# Patient Record
Sex: MALE | Race: WHITE | NOT HISPANIC OR LATINO | Employment: FULL TIME | URBAN - NONMETROPOLITAN AREA
[De-identification: names, ages, dates, MRNs, and addresses within clinical notes are randomized per-mention and may not be internally consistent; named-entity substitution may affect disease eponyms.]

---

## 2018-01-27 ENCOUNTER — ANESTHESIA (OUTPATIENT)
Dept: PERIOP | Facility: HOSPITAL | Age: 53
End: 2018-01-27

## 2018-01-27 ENCOUNTER — APPOINTMENT (OUTPATIENT)
Dept: GENERAL RADIOLOGY | Facility: HOSPITAL | Age: 53
End: 2018-01-27

## 2018-01-27 ENCOUNTER — HOSPITAL ENCOUNTER (EMERGENCY)
Facility: HOSPITAL | Age: 53
Discharge: HOME | End: 2018-01-27
Attending: FAMILY MEDICINE | Admitting: PODIATRIST

## 2018-01-27 ENCOUNTER — ANESTHESIA EVENT (OUTPATIENT)
Dept: PERIOP | Facility: HOSPITAL | Age: 53
End: 2018-01-27

## 2018-01-27 VITALS
SYSTOLIC BLOOD PRESSURE: 159 MMHG | HEIGHT: 72 IN | DIASTOLIC BLOOD PRESSURE: 89 MMHG | OXYGEN SATURATION: 98 % | BODY MASS INDEX: 30.48 KG/M2 | TEMPERATURE: 97.4 F | HEART RATE: 76 BPM | WEIGHT: 225 LBS | RESPIRATION RATE: 20 BRPM

## 2018-01-27 DIAGNOSIS — S92.421B OPEN DISPLACED FRACTURE OF DISTAL PHALANX OF RIGHT GREAT TOE, INITIAL ENCOUNTER: ICD-10-CM

## 2018-01-27 DIAGNOSIS — S92.501A CLOSED FRACTURE OF PHALANX OF RIGHT SECOND TOE, INITIAL ENCOUNTER: ICD-10-CM

## 2018-01-27 DIAGNOSIS — S92.531B OPEN DISPLACED FRACTURE OF DISTAL PHALANX OF LESSER TOE OF RIGHT FOOT, INITIAL ENCOUNTER: ICD-10-CM

## 2018-01-27 DIAGNOSIS — S92.411A CLOSED DISPLACED FRACTURE OF PROXIMAL PHALANX OF RIGHT GREAT TOE, INITIAL ENCOUNTER: ICD-10-CM

## 2018-01-27 DIAGNOSIS — S91.214A: ICD-10-CM

## 2018-01-27 DIAGNOSIS — S91.211A: Primary | ICD-10-CM

## 2018-01-27 PROCEDURE — 13132 CMPLX RPR F/C/C/M/N/AX/G/H/F: CPT | Performed by: PODIATRIST

## 2018-01-27 PROCEDURE — 99284 EMERGENCY DEPT VISIT MOD MDM: CPT

## 2018-01-27 PROCEDURE — 73630 X-RAY EXAM OF FOOT: CPT

## 2018-01-27 PROCEDURE — 25010000003 CEFAZOLIN PER 500 MG: Performed by: FAMILY MEDICINE

## 2018-01-27 PROCEDURE — 28825 PARTIAL AMPUTATION OF TOE: CPT | Performed by: PODIATRIST

## 2018-01-27 PROCEDURE — 25010000002 PROPOFOL 10 MG/ML EMULSION: Performed by: ANESTHESIOLOGY

## 2018-01-27 PROCEDURE — 88305 TISSUE EXAM BY PATHOLOGIST: CPT | Performed by: PODIATRIST

## 2018-01-27 PROCEDURE — 88305 TISSUE EXAM BY PATHOLOGIST: CPT | Performed by: PATHOLOGY

## 2018-01-27 PROCEDURE — 99283 EMERGENCY DEPT VISIT LOW MDM: CPT | Performed by: PODIATRIST

## 2018-01-27 PROCEDURE — 96374 THER/PROPH/DIAG INJ IV PUSH: CPT

## 2018-01-27 RX ORDER — OXYCODONE AND ACETAMINOPHEN 7.5; 325 MG/1; MG/1
1 TABLET ORAL ONCE
Status: COMPLETED | OUTPATIENT
Start: 2018-01-27 | End: 2018-01-27

## 2018-01-27 RX ORDER — CLINDAMYCIN HYDROCHLORIDE 300 MG/1
300 CAPSULE ORAL 3 TIMES DAILY
Qty: 9 CAPSULE | Refills: 0 | Status: SHIPPED | OUTPATIENT
Start: 2018-01-27

## 2018-01-27 RX ORDER — ACETAMINOPHEN 325 MG/1
650 TABLET ORAL ONCE AS NEEDED
Status: DISCONTINUED | OUTPATIENT
Start: 2018-01-27 | End: 2018-01-27 | Stop reason: HOSPADM

## 2018-01-27 RX ORDER — BUPIVACAINE HYDROCHLORIDE 5 MG/ML
INJECTION, SOLUTION EPIDURAL; INTRACAUDAL AS NEEDED
Status: DISCONTINUED | OUTPATIENT
Start: 2018-01-27 | End: 2018-01-27 | Stop reason: HOSPADM

## 2018-01-27 RX ORDER — ONDANSETRON 8 MG/1
8 TABLET, ORALLY DISINTEGRATING ORAL EVERY 8 HOURS PRN
Qty: 30 TABLET | Refills: 0 | Status: SHIPPED | OUTPATIENT
Start: 2018-01-27

## 2018-01-27 RX ORDER — DIPHENHYDRAMINE HYDROCHLORIDE 50 MG/ML
12.5 INJECTION INTRAMUSCULAR; INTRAVENOUS
Status: DISCONTINUED | OUTPATIENT
Start: 2018-01-27 | End: 2018-01-27 | Stop reason: HOSPADM

## 2018-01-27 RX ORDER — SODIUM CHLORIDE 9 MG/ML
INJECTION, SOLUTION INTRAVENOUS
Status: DISCONTINUED
Start: 2018-01-27 | End: 2018-01-27 | Stop reason: HOSPADM

## 2018-01-27 RX ORDER — ATENOLOL 25 MG/1
25 TABLET ORAL DAILY
COMMUNITY

## 2018-01-27 RX ORDER — HYDROCODONE BITARTRATE AND ACETAMINOPHEN 10; 325 MG/1; MG/1
1 TABLET ORAL EVERY 4 HOURS PRN
Qty: 30 TABLET | Refills: 0 | Status: SHIPPED | OUTPATIENT
Start: 2018-01-27 | End: 2018-02-05 | Stop reason: SDUPTHER

## 2018-01-27 RX ORDER — EPHEDRINE SULFATE 50 MG/ML
5 INJECTION, SOLUTION INTRAVENOUS ONCE AS NEEDED
Status: DISCONTINUED | OUTPATIENT
Start: 2018-01-27 | End: 2018-01-27 | Stop reason: HOSPADM

## 2018-01-27 RX ORDER — ONDANSETRON 2 MG/ML
4 INJECTION INTRAMUSCULAR; INTRAVENOUS ONCE AS NEEDED
Status: DISCONTINUED | OUTPATIENT
Start: 2018-01-27 | End: 2018-01-27 | Stop reason: HOSPADM

## 2018-01-27 RX ORDER — LABETALOL HYDROCHLORIDE 5 MG/ML
5 INJECTION, SOLUTION INTRAVENOUS
Status: DISCONTINUED | OUTPATIENT
Start: 2018-01-27 | End: 2018-01-27 | Stop reason: HOSPADM

## 2018-01-27 RX ORDER — NALOXONE HCL 0.4 MG/ML
0.2 VIAL (ML) INJECTION AS NEEDED
Status: DISCONTINUED | OUTPATIENT
Start: 2018-01-27 | End: 2018-01-27 | Stop reason: HOSPADM

## 2018-01-27 RX ORDER — BUPIVACAINE HYDROCHLORIDE 5 MG/ML
INJECTION, SOLUTION EPIDURAL; INTRACAUDAL
Status: DISCONTINUED
Start: 2018-01-27 | End: 2018-01-27 | Stop reason: HOSPADM

## 2018-01-27 RX ORDER — ACETAMINOPHEN 650 MG/1
650 SUPPOSITORY RECTAL ONCE AS NEEDED
Status: DISCONTINUED | OUTPATIENT
Start: 2018-01-27 | End: 2018-01-27 | Stop reason: HOSPADM

## 2018-01-27 RX ORDER — PROPOFOL 10 MG/ML
VIAL (ML) INTRAVENOUS AS NEEDED
Status: DISCONTINUED | OUTPATIENT
Start: 2018-01-27 | End: 2018-01-27 | Stop reason: SURG

## 2018-01-27 RX ORDER — FLUMAZENIL 0.1 MG/ML
0.2 INJECTION INTRAVENOUS AS NEEDED
Status: DISCONTINUED | OUTPATIENT
Start: 2018-01-27 | End: 2018-01-27 | Stop reason: HOSPADM

## 2018-01-27 RX ORDER — MEPERIDINE HYDROCHLORIDE 50 MG/ML
12.5 INJECTION INTRAMUSCULAR; INTRAVENOUS; SUBCUTANEOUS
Status: DISCONTINUED | OUTPATIENT
Start: 2018-01-27 | End: 2018-01-27 | Stop reason: HOSPADM

## 2018-01-27 RX ADMIN — OXYCODONE HYDROCHLORIDE AND ACETAMINOPHEN 1 TABLET: 7.5; 325 TABLET ORAL at 14:16

## 2018-01-27 RX ADMIN — SODIUM CHLORIDE 1000 ML: 900 INJECTION, SOLUTION INTRAVENOUS at 17:22

## 2018-01-27 RX ADMIN — PROPOFOL 200 MG: 10 INJECTION, EMULSION INTRAVENOUS at 18:19

## 2018-01-27 RX ADMIN — SODIUM CHLORIDE 2 G: 9 INJECTION INTRAMUSCULAR; INTRAVENOUS; SUBCUTANEOUS at 17:22

## 2018-01-27 NOTE — ANESTHESIA PREPROCEDURE EVALUATION
Anesthesia Evaluation     NPO Solid Status: > 8 hours  NPO Liquid Status: > 8 hours     Airway   Mallampati: II  TM distance: >3 FB  Neck ROM: full  no difficulty expected  Dental    (+) poor dentition    Pulmonary     breath sounds clear to auscultation  (+) a smoker Current, COPD mild,   Cardiovascular     Rhythm: regular  Rate: normal    (+) hypertension,       Neuro/Psych  (+) psychiatric history Anxiety,     GI/Hepatic/Renal/Endo    (+) obesity,  GERD well controlled,     Musculoskeletal     Abdominal     Abdomen: soft.   Substance History      OB/GYN          Other   (+) arthritis                                             Anesthesia Plan    ASA 3     general     intravenous induction

## 2018-01-28 NOTE — ANESTHESIA POSTPROCEDURE EVALUATION
Patient: Say Ha    Procedure Summary     Date Anesthesia Start Anesthesia Stop Room / Location    01/27/18 1820   [facility] / [facility]       Procedure Diagnosis Surgeon Provider    FOOT IRRIGATION, DEBRIDEMENT AND REPAIR (Right Foot) Open displaced fracture of distal phalanx of lesser toe of right foot, initial encounter; Open displaced fracture of distal phalanx of right great toe, initial encounter; Laceration of lesser toe of right foot with damage to nail, foreign body presence unspecified, initial encounter; Laceration of right great toe with damage to nail, foreign body presence unspecified, initial encounter  (Open displaced fracture of distal phalanx of lesser toe of right foot, initial encounter [S92.531B]; Open displaced fracture of distal phalanx of right great toe, initial encounter [S92.421B]; Laceration of lesser toe of right foot with damage to nail, foreign body presence unspecified, initial encounter [S91.214A]; Laceration of right great toe with damage to nail, foreign body presence unspecified, initial encounter [S91.211A]) OLGA Baumann MD          Anesthesia Type: general  Last vitals  BP   142/95 (01/27/18 1712)   Temp   98.6 °F (37 °C) (01/27/18 1353)   Pulse   65 (01/27/18 1712)   Resp   18 (01/27/18 1712)     SpO2   95 % (01/27/18 1712)     Post Anesthesia Care and Evaluation    Patient location during evaluation: PACU  Patient participation: complete - patient participated  Level of consciousness: awake  Pain score: 1  Pain management: adequate  Airway patency: patent  Anesthetic complications: No anesthetic complications  PONV Status: none  Cardiovascular status: acceptable  Respiratory status: acceptable  Hydration status: acceptable

## 2018-01-30 ENCOUNTER — OFFICE VISIT (OUTPATIENT)
Dept: PODIATRY | Facility: CLINIC | Age: 53
End: 2018-01-30

## 2018-01-30 VITALS
BODY MASS INDEX: 30.48 KG/M2 | HEART RATE: 67 BPM | DIASTOLIC BLOOD PRESSURE: 113 MMHG | OXYGEN SATURATION: 95 % | SYSTOLIC BLOOD PRESSURE: 159 MMHG | HEIGHT: 72 IN | WEIGHT: 225 LBS

## 2018-01-30 DIAGNOSIS — S92.421D OPEN DISPLACED FRACTURE OF DISTAL PHALANX OF RIGHT GREAT TOE WITH ROUTINE HEALING, SUBSEQUENT ENCOUNTER: ICD-10-CM

## 2018-01-30 DIAGNOSIS — S92.531D: Primary | ICD-10-CM

## 2018-01-30 DIAGNOSIS — S91.211D: ICD-10-CM

## 2018-01-30 LAB
LAB AP CASE REPORT: NORMAL
Lab: NORMAL
PATH REPORT.FINAL DX SPEC: NORMAL
PATH REPORT.GROSS SPEC: NORMAL

## 2018-01-30 PROCEDURE — 99024 POSTOP FOLLOW-UP VISIT: CPT | Performed by: PODIATRIST

## 2018-01-30 NOTE — PROGRESS NOTES
Say Ha  1965  52 y.o. male     Patient presents today for post op recheck of his right foot.    01/30/2018    Chief Complaint   Patient presents with   • Right Foot - postop recheck           History of Present Illness    Say Ha is a 52 y.o.male who presents to clinic for his first postoperative visit.  Had right irrigation debridement with right partial second digit amputation and repair of lacerations to the great toe and second toe date of surgery January 27, 2018.  His dressings are clean, dry and intact.  He is weightbearing in surgical shoe.          Past Medical History:   Diagnosis Date   • Crush injury of foot, right, subsequent encounter 01/28/2018   • Hypertension          Past Surgical History:   Procedure Laterality Date   • FOOT IRRIGATION, DEBRIDEMENT AND REPAIR Right 1/27/2018    Procedure: FOOT IRRIGATION, DEBRIDEMENT AND REPAIR;  Surgeon: Ángel Griffin DPM;  Location: [facility];  Service:    • FOOT SURGERY     • SINUS SURGERY           Family History   Problem Relation Age of Onset   • Heart disease Father        No Known Allergies    Social History     Social History   • Marital status:      Spouse name: N/A   • Number of children: N/A   • Years of education: N/A     Occupational History   • Not on file.     Social History Main Topics   • Smoking status: Never Smoker   • Smokeless tobacco: Never Used   • Alcohol use Yes      Comment: regular   • Drug use: No   • Sexual activity: Yes     Partners: Female     Other Topics Concern   • Not on file     Social History Narrative         Current Outpatient Prescriptions   Medication Sig Dispense Refill   • atenolol (TENORMIN) 25 MG tablet Take 25 mg by mouth Daily.     • clindamycin (CLEOCIN) 300 MG capsule Take 1 capsule by mouth 3 (Three) Times a Day. 9 capsule 0   • HYDROcodone-acetaminophen (NORCO)  MG per tablet Take 1 tablet by mouth Every 4 (Four) Hours As Needed for Moderate Pain . 30 tablet 0   •  "ondansetron ODT (ZOFRAN ODT) 8 MG disintegrating tablet Take 1 tablet by mouth Every 8 (Eight) Hours As Needed for Nausea or Vomiting. 30 tablet 0     No current facility-administered medications for this visit.          OBJECTIVE    BP (!) 159/113  Pulse 67  Ht 182.9 cm (72\")  Wt 102 kg (225 lb)  SpO2 95%  BMI 30.52 kg/m2      Review of Systems   Constitutional: Negative.  Negative for activity change.   HENT: Negative.  Negative for congestion.    Eyes: Negative.  Negative for discharge.   Respiratory: Negative.  Negative for apnea.    Cardiovascular: Negative.  Negative for chest pain.   Gastrointestinal: Negative.  Negative for abdominal distention.   Endocrine: Negative.  Negative for cold intolerance.   Genitourinary: Negative.  Negative for difficulty urinating.   Musculoskeletal:        Right foot pain   Skin: Negative.  Negative for color change.   Allergic/Immunologic: Negative.  Negative for environmental allergies.   Neurological: Negative.  Negative for dizziness.   Hematological: Negative.  Negative for adenopathy.   Psychiatric/Behavioral: Negative.  Negative for agitation.         Constitutional: well developed, well nourished    HEENT: Normocephalic and atraumatic, normal hearing    Respiratory: Non labored respirations noted    RLE Exam: Laceration sites are well approximated.  Sutures are intact to partial second digit amputation.  Moderate erythema, edema and ecchymosis.  Distal tip of right second digit appears slightly necrotic.  Negative Homans    Psychiatric: A&O x 3 with normal mood and affect. NAD.       Procedures        ASSESSMENT AND PLAN    Say was seen today for postop recheck.    Diagnoses and all orders for this visit:    Open displaced fracture of distal phalanx of lesser toe of right foot with routine healing, subsequent encounter    Open displaced fracture of distal phalanx of right great toe with routine healing, subsequent encounter    Laceration of right great toe with " damage to nail, foreign body presence unspecified, subsequent encounter    - Sterile dressing change performed  - Keep dressing clean, dry and intact  - Weightbearing as tolerated in surgical shoe  - Continue antibiotics until course is complete  - all questions were answered  - return to clinic in 1 week, repeat x-rays          This document has been electronically signed by Ángel Griffin DPM on January 30, 2018 12:50 PM     1/30/2018  12:50 PM

## 2018-01-31 ENCOUNTER — TELEPHONE (OUTPATIENT)
Dept: PODIATRY | Facility: CLINIC | Age: 53
End: 2018-01-31

## 2018-01-31 NOTE — TELEPHONE ENCOUNTER
WIFE CALLED AND SAID THAT PATIENT TOOK LAST ANTIBIOTIC THIS MORNING.    IS THAT OKAY?  SHOULD HE STILL BE ON THEM?    THANKS.

## 2018-02-01 NOTE — OP NOTE
Date of Procedure: 1/27/18     SURGEON: Ángel Griffin DPM      ASSISTANT: PETEY Ponce      PREOPERATIVE DIAGNOSIS:   1.  Open fracture right hallux distal phalanx  2.  Laceration with damage to nail right hallux  3.  Open fracture of second digit  right foot  4.  Laceration with destruction of nail and nailbed right second digit  5.  Crush injury right foot     POSTOPERATIVE DIAGNOSIS: same     PROCEDURES PERFORMED:   1.  Repair of complex laceration measuring approximately 3 cm right hallux  2.  Right partial second digit amputation     ANESTHESIA: mac with local block     HEMOSTASIS: direct pressure      ESTIMATED BLOOD LOSS: 50 mL.      MATERIALS: none     INJECTABLES:20cc 0.5% marcaine plain     COMPLICATIONS: None.      CONDITION: Stable.     PATHOLOGY: none     INDICATIONS FOR PROCEDURE: This is a 52-year-old male who was seen in the emergency department this afternoon with acute crush injury to his right foot.  An attempt was made to repair lacerations to the toes in the emergency department.  However, it was determined that distal part of the right second digit was unsalvageable.  The plan is for repair of laceration to right great toe and partial right second digit amputation.  He agrees to undergo the surgical intervention at this time. Consent is signed and documented in the chart. History and physical exam were reviewed prior to patient being taken to the operating room and n.p.o. status was confirmed. No guarantees were given or implied regarding the outcome of this treatment.      DESCRIPTION OF PROCEDURE: After mild sedation was administered by the anesthesia team in the preoperative holding area the patient was transported to the operating room and placed in a supine position. Iv sedation was then administered and the right foot was prepped and draped in usual sterile technique and a timeout was performed to confirm the correct patient, correct site, and correct procedure.      Attention  was then directed to the right foot with there is noted to be a 3 cm laceration to the lateral aspect of the right hallux with damage to the nailbed and lateral nail plate.  The laceration extended deep to bone.  The second digit was 80% transected at the distal most aspect.  There is exposed bone within the wound.  The distal aspect of the toe was starting to appear dusky and has delayed capillary fill time.  At this time both toes were irrigated with copious amounts of antibiotic irrigation.  Next the right hallux laceration was reapproximated utilizing 4-0 Vicryl for deep and 4-0 nylon for the skin.  The second digit was deemed nonsalvageable to the distal aspect.  At this time the distal toe was disarticulated at the distal interphalangeal joint.  Toe was closed with 4-0 nylon.  Sterile dressing was then applied.  The patient tolerated anesthesia and the procedure well and was transported from the operating room to the PACU with vital signs stable and neurovascular status intact to the operative extremity.    The plan is to discharge this patient home once stable per anesthesia.  He can resume his regular diet.  He is weightbearing as tolerated in a surgical shoe.  He is to keep the dressing clean, dry and intact until his first postoperative visit.  He has been given a written prescription for oral narcotic as well as an oral antibiotic.          This document has been electronically signed by Ángel Griffin DPM on February 1, 2018 12:35 PM

## 2018-02-05 ENCOUNTER — OFFICE VISIT (OUTPATIENT)
Dept: PODIATRY | Facility: CLINIC | Age: 53
End: 2018-02-05

## 2018-02-05 VITALS — HEART RATE: 73 BPM | OXYGEN SATURATION: 98 % | HEIGHT: 72 IN | WEIGHT: 225 LBS | BODY MASS INDEX: 30.48 KG/M2

## 2018-02-05 DIAGNOSIS — S92.421D OPEN DISPLACED FRACTURE OF DISTAL PHALANX OF RIGHT GREAT TOE WITH ROUTINE HEALING, SUBSEQUENT ENCOUNTER: ICD-10-CM

## 2018-02-05 DIAGNOSIS — S92.531D: Primary | ICD-10-CM

## 2018-02-05 DIAGNOSIS — S91.211D: ICD-10-CM

## 2018-02-05 PROCEDURE — 99024 POSTOP FOLLOW-UP VISIT: CPT | Performed by: PODIATRIST

## 2018-02-05 RX ORDER — HYDROCODONE BITARTRATE AND ACETAMINOPHEN 10; 325 MG/1; MG/1
1 TABLET ORAL EVERY 6 HOURS PRN
Qty: 30 TABLET | Refills: 0 | Status: SHIPPED | OUTPATIENT
Start: 2018-02-05 | End: 2018-02-26 | Stop reason: SDUPTHER

## 2018-02-05 NOTE — PROGRESS NOTES
Say Ha  1965  52 y.o. male     Patient presents today for post op recheck of his right foot with repeat xrays.    01/30/2018    Chief Complaint   Patient presents with   • Right Foot - post op recheck           History of Present Illness    Say Ha is a 52 y.o.male who presents to clinic for his second postoperative visit.  Had right irrigation debridement with right partial second digit amputation and repair of lacerations to the great toe and second toe date of surgery January 27, 2018.  His dressings are clean, dry and intact.  He is weightbearing in surgical shoe.          Past Medical History:   Diagnosis Date   • Crush injury of foot, right, subsequent encounter 01/28/2018   • Hypertension          Past Surgical History:   Procedure Laterality Date   • FOOT IRRIGATION, DEBRIDEMENT AND REPAIR Right 1/27/2018    Procedure: FOOT IRRIGATION, DEBRIDEMENT AND REPAIR;  Surgeon: Ángel Griffin DPM;  Location: [facility];  Service:    • FOOT SURGERY     • SINUS SURGERY           Family History   Problem Relation Age of Onset   • Heart disease Father        No Known Allergies    Social History     Social History   • Marital status:      Spouse name: N/A   • Number of children: N/A   • Years of education: N/A     Occupational History   • Not on file.     Social History Main Topics   • Smoking status: Never Smoker   • Smokeless tobacco: Never Used   • Alcohol use Yes      Comment: regular   • Drug use: No   • Sexual activity: Yes     Partners: Female     Other Topics Concern   • Not on file     Social History Narrative         Current Outpatient Prescriptions   Medication Sig Dispense Refill   • atenolol (TENORMIN) 25 MG tablet Take 25 mg by mouth Daily.     • clindamycin (CLEOCIN) 300 MG capsule Take 1 capsule by mouth 3 (Three) Times a Day. 9 capsule 0   • HYDROcodone-acetaminophen (NORCO)  MG per tablet Take 1 tablet by mouth Every 6 (Six) Hours As Needed for Moderate Pain . 30  "tablet 0   • ondansetron ODT (ZOFRAN ODT) 8 MG disintegrating tablet Take 1 tablet by mouth Every 8 (Eight) Hours As Needed for Nausea or Vomiting. 30 tablet 0     No current facility-administered medications for this visit.          OBJECTIVE    Pulse 73  Ht 182.9 cm (72\")  Wt 102 kg (225 lb)  SpO2 98%  BMI 30.52 kg/m2      Review of Systems   Constitutional: Negative.  Negative for activity change.   HENT: Negative.  Negative for congestion.    Eyes: Negative.  Negative for discharge.   Respiratory: Negative.  Negative for apnea.    Cardiovascular: Negative.  Negative for chest pain.   Gastrointestinal: Negative.  Negative for abdominal distention.   Endocrine: Negative.  Negative for cold intolerance.   Genitourinary: Negative.  Negative for difficulty urinating.   Musculoskeletal:        Right foot pain   Skin: Negative.  Negative for color change.   Allergic/Immunologic: Negative.  Negative for environmental allergies.   Neurological: Negative.  Negative for dizziness.   Hematological: Negative.  Negative for adenopathy.   Psychiatric/Behavioral: Negative.  Negative for agitation.         Constitutional: well developed, well nourished    HEENT: Normocephalic and atraumatic, normal hearing    Respiratory: Non labored respirations noted    RLE Exam: Laceration sites are well approximated.  Sutures are intact to partial second digit amputation.  Improved erythema, edema and ecchymosis.  Distal tip of right second digit has improved. Small area of necrosis still noted. .  Negative Homans    Psychiatric: A&O x 3 with normal mood and affect. NAD.       Procedures        ASSESSMENT AND PLAN    Say was seen today for post op recheck.    Diagnoses and all orders for this visit:    Open displaced fracture of distal phalanx of lesser toe of right foot with routine healing, subsequent encounter  -     XR Foot 3+ View Right    Open displaced fracture of distal phalanx of right great toe with routine healing, " subsequent encounter  -     XR Foot 3+ View Right    Laceration of right great toe with damage to nail, foreign body presence unspecified, subsequent encounter    Other orders  -     HYDROcodone-acetaminophen (NORCO)  MG per tablet; Take 1 tablet by mouth Every 6 (Six) Hours As Needed for Moderate Pain .    - x-rays taken and reviewed  - Sterile dressing change performed  - Keep dressing clean, dry and intact  - Weightbearing as tolerated in surgical shoe  - all questions were answered  - return to clinic in 1 week           This document has been electronically signed by Ángel Griffin DPM on February 5, 2018 12:49 PM     2/5/2018  12:49 PM

## 2018-02-13 ENCOUNTER — OFFICE VISIT (OUTPATIENT)
Dept: PODIATRY | Facility: CLINIC | Age: 53
End: 2018-02-13

## 2018-02-13 VITALS — HEIGHT: 72 IN | OXYGEN SATURATION: 98 % | BODY MASS INDEX: 30.48 KG/M2 | HEART RATE: 79 BPM | WEIGHT: 225 LBS

## 2018-02-13 DIAGNOSIS — S92.421D OPEN DISPLACED FRACTURE OF DISTAL PHALANX OF RIGHT GREAT TOE WITH ROUTINE HEALING, SUBSEQUENT ENCOUNTER: ICD-10-CM

## 2018-02-13 DIAGNOSIS — S91.211D: ICD-10-CM

## 2018-02-13 DIAGNOSIS — S92.531D: Primary | ICD-10-CM

## 2018-02-13 PROCEDURE — 99024 POSTOP FOLLOW-UP VISIT: CPT | Performed by: PODIATRIST

## 2018-02-13 NOTE — PROGRESS NOTES
Say Ha  1965  52 y.o. male     Patient presents today for post op recheck of his right foot.    02/13/2018      Chief Complaint   Patient presents with   • Right Foot - post op recheck           History of Present Illness    Say Ha is a 52 y.o.male who presents to clinic for his third postoperative visit.  Had right irrigation debridement with right partial second digit amputation and repair of lacerations to the great toe and second toe date of surgery January 27, 2018.  His dressings are clean, dry and intact.  He is weightbearing in surgical shoe.          Past Medical History:   Diagnosis Date   • Crush injury of foot, right, subsequent encounter 01/28/2018   • Hypertension          Past Surgical History:   Procedure Laterality Date   • FOOT IRRIGATION, DEBRIDEMENT AND REPAIR Right 1/27/2018    Procedure: FOOT IRRIGATION, DEBRIDEMENT AND REPAIR;  Surgeon: Ángel Griffin DPM;  Location: [facility];  Service:    • FOOT SURGERY     • SINUS SURGERY           Family History   Problem Relation Age of Onset   • Heart disease Father        No Known Allergies    Social History     Social History   • Marital status:      Spouse name: N/A   • Number of children: N/A   • Years of education: N/A     Occupational History   • Not on file.     Social History Main Topics   • Smoking status: Never Smoker   • Smokeless tobacco: Never Used   • Alcohol use Yes      Comment: regular   • Drug use: No   • Sexual activity: Yes     Partners: Female     Other Topics Concern   • Not on file     Social History Narrative         Current Outpatient Prescriptions   Medication Sig Dispense Refill   • atenolol (TENORMIN) 25 MG tablet Take 25 mg by mouth Daily.     • clindamycin (CLEOCIN) 300 MG capsule Take 1 capsule by mouth 3 (Three) Times a Day. 9 capsule 0   • HYDROcodone-acetaminophen (NORCO)  MG per tablet Take 1 tablet by mouth Every 6 (Six) Hours As Needed for Moderate Pain . 30 tablet 0   •  "ondansetron ODT (ZOFRAN ODT) 8 MG disintegrating tablet Take 1 tablet by mouth Every 8 (Eight) Hours As Needed for Nausea or Vomiting. 30 tablet 0     No current facility-administered medications for this visit.          OBJECTIVE    Pulse 79  Ht 182.9 cm (72\")  Wt 102 kg (225 lb)  SpO2 98%  BMI 30.52 kg/m2      Review of Systems   Constitutional: Negative.  Negative for activity change.   HENT: Negative.  Negative for congestion.    Eyes: Negative.  Negative for discharge.   Respiratory: Negative.  Negative for apnea.    Cardiovascular: Negative.  Negative for chest pain.   Gastrointestinal: Negative.  Negative for abdominal distention.   Endocrine: Negative.  Negative for cold intolerance.   Genitourinary: Negative.  Negative for difficulty urinating.   Musculoskeletal: Negative.  Negative for arthralgias.   Skin: Negative.  Negative for color change.   Allergic/Immunologic: Negative.  Negative for environmental allergies.   Neurological: Negative.  Negative for dizziness.   Hematological: Negative.  Negative for adenopathy.   Psychiatric/Behavioral: Negative.  Negative for agitation.         Constitutional: well developed, well nourished    HEENT: Normocephalic and atraumatic, normal hearing    Respiratory: Non labored respirations noted    RLE Exam: Laceration sites are well approximated.  Sutures are intact to partial second digit amputation.  Improved erythema, edema and ecchymosis.  Distal tip of right second digit has improved. Small area of necrosis still noted. .  Negative Homans    Psychiatric: A&O x 3 with normal mood and affect. NAD.       Procedures        ASSESSMENT AND PLAN    Say was seen today for post op recheck.    Diagnoses and all orders for this visit:    Open displaced fracture of distal phalanx of lesser toe of right foot with routine healing, subsequent encounter    Open displaced fracture of distal phalanx of right great toe with routine healing, subsequent encounter    Laceration " of right great toe with damage to nail, foreign body presence unspecified, subsequent encounter    - sutures removed.  - Sterile dressing change performed  - Keep dressing clean, dry and intact  - Weightbearing as tolerated in surgical shoe  - all questions were answered  - return to clinic in 1 week           This document has been electronically signed by Ángel Griffin DPM on February 13, 2018 11:39 AM     2/13/2018  11:39 AM

## 2018-02-19 ENCOUNTER — OFFICE VISIT (OUTPATIENT)
Dept: PODIATRY | Facility: CLINIC | Age: 53
End: 2018-02-19

## 2018-02-19 VITALS — HEIGHT: 72 IN | HEART RATE: 80 BPM | BODY MASS INDEX: 30.48 KG/M2 | OXYGEN SATURATION: 98 % | WEIGHT: 225 LBS

## 2018-02-19 DIAGNOSIS — S92.421D OPEN DISPLACED FRACTURE OF DISTAL PHALANX OF RIGHT GREAT TOE WITH ROUTINE HEALING, SUBSEQUENT ENCOUNTER: ICD-10-CM

## 2018-02-19 DIAGNOSIS — S92.531D: Primary | ICD-10-CM

## 2018-02-19 DIAGNOSIS — S91.211D: ICD-10-CM

## 2018-02-19 PROCEDURE — 99024 POSTOP FOLLOW-UP VISIT: CPT | Performed by: PODIATRIST

## 2018-02-19 NOTE — PROGRESS NOTES
Say Ha  1965  52 y.o. male     Patient presents today for post op recheck of his right foot.    02/13/2018  Chief Complaint   Patient presents with   • Right Foot - post op recheck           History of Present Illness    Say Ha is a 52 y.o.male who presents to clinic for his postoperative visit.  Had right irrigation debridement with right partial second digit amputation and repair of lacerations to the great toe and second toe date of surgery January 27, 2018.He is weightbearing in surgical shoe.          Past Medical History:   Diagnosis Date   • Crush injury of foot, right, subsequent encounter 01/28/2018   • Hypertension          Past Surgical History:   Procedure Laterality Date   • FOOT IRRIGATION, DEBRIDEMENT AND REPAIR Right 1/27/2018    Procedure: FOOT IRRIGATION, DEBRIDEMENT AND REPAIR;  Surgeon: Ángel Griffin DPM;  Location: [facility];  Service:    • FOOT SURGERY     • SINUS SURGERY           Family History   Problem Relation Age of Onset   • Heart disease Father        No Known Allergies    Social History     Social History   • Marital status:      Spouse name: N/A   • Number of children: N/A   • Years of education: N/A     Occupational History   • Not on file.     Social History Main Topics   • Smoking status: Never Smoker   • Smokeless tobacco: Never Used   • Alcohol use Yes      Comment: regular   • Drug use: No   • Sexual activity: Yes     Partners: Female     Other Topics Concern   • Not on file     Social History Narrative         Current Outpatient Prescriptions   Medication Sig Dispense Refill   • atenolol (TENORMIN) 25 MG tablet Take 25 mg by mouth Daily.     • clindamycin (CLEOCIN) 300 MG capsule Take 1 capsule by mouth 3 (Three) Times a Day. 9 capsule 0   • HYDROcodone-acetaminophen (NORCO)  MG per tablet Take 1 tablet by mouth Every 6 (Six) Hours As Needed for Moderate Pain . 30 tablet 0   • ondansetron ODT (ZOFRAN ODT) 8 MG disintegrating tablet  "Take 1 tablet by mouth Every 8 (Eight) Hours As Needed for Nausea or Vomiting. 30 tablet 0     No current facility-administered medications for this visit.          OBJECTIVE    Pulse 80  Ht 182.9 cm (72\")  Wt 102 kg (225 lb)  SpO2 98%  BMI 30.52 kg/m2      Review of Systems   Constitutional: Negative.  Negative for activity change and appetite change.   HENT: Negative.  Negative for congestion and dental problem.    Eyes: Negative.  Negative for discharge.   Respiratory: Negative.  Negative for apnea.    Cardiovascular: Negative.  Negative for chest pain and leg swelling.   Gastrointestinal: Negative.  Negative for abdominal distention.   Endocrine: Negative.  Negative for cold intolerance.   Genitourinary: Negative.  Negative for difficulty urinating and dysuria.   Musculoskeletal: Negative.  Negative for arthralgias.   Skin: Negative.  Negative for color change.   Allergic/Immunologic: Negative.  Negative for environmental allergies.   Neurological: Negative.  Negative for dizziness.   Hematological: Negative.  Negative for adenopathy.   Psychiatric/Behavioral: Negative.  Negative for agitation.         Constitutional: well developed, well nourished    HEENT: Normocephalic and atraumatic, normal hearing    Respiratory: Non labored respirations noted    RLE Exam: Distal tip of right second digit continues to improve.. Small area of delayed healing noted. Hallux is healing nicely .  Negative Homans    Psychiatric: A&O x 3 with normal mood and affect. NAD.       Procedures        ASSESSMENT AND PLAN    Say was seen today for post op recheck.    Diagnoses and all orders for this visit:    Open displaced fracture of distal phalanx of lesser toe of right foot with routine healing, subsequent encounter    Open displaced fracture of distal phalanx of right great toe with routine healing, subsequent encounter    Laceration of right great toe with damage to nail, foreign body presence unspecified, subsequent " encounter    - applied medihoney and bandaids  - pt to do daily dressing changes. Do not get wet  - ok to wear regular shoe gear  - all questions were answered  - return to clinic in 1 week           This document has been electronically signed by Ángel Griffin DPM on February 19, 2018 9:49 AM     2/19/2018  9:49 AM

## 2018-02-26 ENCOUNTER — OFFICE VISIT (OUTPATIENT)
Dept: PODIATRY | Facility: CLINIC | Age: 53
End: 2018-02-26

## 2018-02-26 VITALS — OXYGEN SATURATION: 98 % | WEIGHT: 225 LBS | HEART RATE: 71 BPM | BODY MASS INDEX: 30.48 KG/M2 | HEIGHT: 72 IN

## 2018-02-26 DIAGNOSIS — S92.531D: Primary | ICD-10-CM

## 2018-02-26 DIAGNOSIS — S91.211D: ICD-10-CM

## 2018-02-26 DIAGNOSIS — S92.421D OPEN DISPLACED FRACTURE OF DISTAL PHALANX OF RIGHT GREAT TOE WITH ROUTINE HEALING, SUBSEQUENT ENCOUNTER: ICD-10-CM

## 2018-02-26 PROCEDURE — 99024 POSTOP FOLLOW-UP VISIT: CPT | Performed by: PODIATRIST

## 2018-02-26 RX ORDER — HYDROCODONE BITARTRATE AND ACETAMINOPHEN 10; 325 MG/1; MG/1
1 TABLET ORAL EVERY 6 HOURS PRN
Qty: 30 TABLET | Refills: 0 | Status: SHIPPED | OUTPATIENT
Start: 2018-02-26

## 2018-02-26 NOTE — PROGRESS NOTES
Say Ha  1965  52 y.o. male     Patient presents today for post op recheck of his right foot.    02/26/2018    Chief Complaint   Patient presents with   • Right Foot - post op recheck           History of Present Illness    Say Ha is a 52 y.o.male who presents to clinic for his postoperative visit.  Had right irrigation debridement with right partial second digit amputation and repair of lacerations to the great toe and second toe date of surgery January 27, 2018.He is weightbearing in regular shoes.          Past Medical History:   Diagnosis Date   • Crush injury of foot, right, subsequent encounter 01/28/2018   • Hypertension          Past Surgical History:   Procedure Laterality Date   • FOOT IRRIGATION, DEBRIDEMENT AND REPAIR Right 1/27/2018    Procedure: FOOT IRRIGATION, DEBRIDEMENT AND REPAIR;  Surgeon: Ángel Griffin DPM;  Location: [facility];  Service:    • FOOT SURGERY     • SINUS SURGERY           Family History   Problem Relation Age of Onset   • Heart disease Father        No Known Allergies    Social History     Social History   • Marital status:      Spouse name: N/A   • Number of children: N/A   • Years of education: N/A     Occupational History   • Not on file.     Social History Main Topics   • Smoking status: Never Smoker   • Smokeless tobacco: Never Used   • Alcohol use Yes      Comment: regular   • Drug use: No   • Sexual activity: Yes     Partners: Female     Other Topics Concern   • Not on file     Social History Narrative         Current Outpatient Prescriptions   Medication Sig Dispense Refill   • atenolol (TENORMIN) 25 MG tablet Take 25 mg by mouth Daily.     • clindamycin (CLEOCIN) 300 MG capsule Take 1 capsule by mouth 3 (Three) Times a Day. 9 capsule 0   • HYDROcodone-acetaminophen (NORCO)  MG per tablet Take 1 tablet by mouth Every 6 (Six) Hours As Needed for Moderate Pain . 30 tablet 0   • ondansetron ODT (ZOFRAN ODT) 8 MG disintegrating tablet  "Take 1 tablet by mouth Every 8 (Eight) Hours As Needed for Nausea or Vomiting. 30 tablet 0     No current facility-administered medications for this visit.          OBJECTIVE    Pulse 71  Ht 182.9 cm (72\")  Wt 102 kg (225 lb)  SpO2 98%  BMI 30.52 kg/m2      Review of Systems   Constitutional: Negative.  Negative for activity change and appetite change.   HENT: Negative.  Negative for congestion and dental problem.    Eyes: Negative.  Negative for discharge.   Respiratory: Negative.  Negative for apnea.    Cardiovascular: Negative.  Negative for chest pain and leg swelling.   Gastrointestinal: Negative.  Negative for abdominal distention.   Endocrine: Negative.  Negative for cold intolerance.   Genitourinary: Negative.  Negative for difficulty urinating and dysuria.   Musculoskeletal:        Right 2nd toe pain   Skin: Negative.  Negative for color change.   Allergic/Immunologic: Negative.  Negative for environmental allergies.   Neurological: Negative.  Negative for dizziness.   Hematological: Negative.  Negative for adenopathy.   Psychiatric/Behavioral: Negative.  Negative for agitation.         Constitutional: well developed, well nourished    HEENT: Normocephalic and atraumatic, normal hearing    Respiratory: Non labored respirations noted    RLE Exam: Distal tip of right second digit continues to improve. Small area of delayed healing noted. Hallux is healing nicely.  Negative Homans    Psychiatric: A&O x 3 with normal mood and affect. NAD.       Procedures        ASSESSMENT AND PLAN    Say was seen today for post op recheck.    Diagnoses and all orders for this visit:    Open displaced fracture of distal phalanx of lesser toe of right foot with routine healing, subsequent encounter    Open displaced fracture of distal phalanx of right great toe with routine healing, subsequent encounter    Laceration of right great toe with damage to nail, foreign body presence unspecified, subsequent encounter    Other " orders  -     HYDROcodone-acetaminophen (NORCO)  MG per tablet; Take 1 tablet by mouth Every 6 (Six) Hours As Needed for Moderate Pain .    - applied medihoney and bandaids  - pt to do daily dressing changes. Do not get wet  - ok to wear regular shoe gear  - refilled pain medicine  - all questions were answered  - return to clinic in 2 weeks          This document has been electronically signed by Ángel Griffin DPM on February 26, 2018 1:09 PM     2/26/2018  1:09 PM

## 2018-03-12 ENCOUNTER — OFFICE VISIT (OUTPATIENT)
Dept: PODIATRY | Facility: CLINIC | Age: 53
End: 2018-03-12

## 2018-03-12 VITALS — BODY MASS INDEX: 30.48 KG/M2 | WEIGHT: 225 LBS | HEIGHT: 72 IN

## 2018-03-12 DIAGNOSIS — S92.421D OPEN DISPLACED FRACTURE OF DISTAL PHALANX OF RIGHT GREAT TOE WITH ROUTINE HEALING, SUBSEQUENT ENCOUNTER: ICD-10-CM

## 2018-03-12 DIAGNOSIS — S92.531D: Primary | ICD-10-CM

## 2018-03-12 PROCEDURE — 99024 POSTOP FOLLOW-UP VISIT: CPT | Performed by: PODIATRIST

## 2018-03-12 NOTE — PROGRESS NOTES
Say Ha  1965  52 y.o. male     Patient presents today for post op recheck of his right foot.    02/26/2018    Chief Complaint   Patient presents with   • Right Foot - Post-op Follow-up           History of Present Illness    Say Ha is a 52 y.o.male who presents to clinic for his postoperative visit.  Had right irrigation debridement with right partial second digit amputation and repair of lacerations to the great toe and second toe date of surgery January 27, 2018.He is weightbearing in regular shoes.          Past Medical History:   Diagnosis Date   • Crush injury of foot, right, subsequent encounter 01/28/2018   • Hypertension          Past Surgical History:   Procedure Laterality Date   • FOOT IRRIGATION, DEBRIDEMENT AND REPAIR Right 1/27/2018    Procedure: FOOT IRRIGATION, DEBRIDEMENT AND REPAIR;  Surgeon: Ángel Griffin DPM;  Location: [facility];  Service:    • FOOT SURGERY     • SINUS SURGERY           Family History   Problem Relation Age of Onset   • Heart disease Father        No Known Allergies    Social History     Social History   • Marital status:      Spouse name: N/A   • Number of children: N/A   • Years of education: N/A     Occupational History   • Not on file.     Social History Main Topics   • Smoking status: Never Smoker   • Smokeless tobacco: Never Used   • Alcohol use Yes      Comment: regular   • Drug use: No   • Sexual activity: Yes     Partners: Female     Other Topics Concern   • Not on file     Social History Narrative   • No narrative on file         Current Outpatient Prescriptions   Medication Sig Dispense Refill   • atenolol (TENORMIN) 25 MG tablet Take 25 mg by mouth Daily.     • HYDROcodone-acetaminophen (NORCO)  MG per tablet Take 1 tablet by mouth Every 6 (Six) Hours As Needed for Moderate Pain . 30 tablet 0   • ondansetron ODT (ZOFRAN ODT) 8 MG disintegrating tablet Take 1 tablet by mouth Every 8 (Eight) Hours As Needed for Nausea or  "Vomiting. 30 tablet 0   • clindamycin (CLEOCIN) 300 MG capsule Take 1 capsule by mouth 3 (Three) Times a Day. 9 capsule 0     No current facility-administered medications for this visit.          OBJECTIVE    Ht 182.9 cm (72\")   Wt 102 kg (225 lb)   BMI 30.52 kg/m²       Review of Systems   Constitutional: Negative.  Negative for activity change and appetite change.   HENT: Negative.  Negative for congestion and dental problem.    Eyes: Negative.  Negative for discharge.   Respiratory: Negative.  Negative for apnea.    Cardiovascular: Negative.  Negative for chest pain and leg swelling.   Gastrointestinal: Negative.  Negative for abdominal distention.   Endocrine: Negative.  Negative for cold intolerance.   Genitourinary: Negative.  Negative for difficulty urinating and dysuria.   Musculoskeletal:        Right 2nd toe pain   Skin: Negative.  Negative for color change.   Allergic/Immunologic: Negative.  Negative for environmental allergies.   Neurological: Negative.  Negative for dizziness.   Hematological: Negative.  Negative for adenopathy.   Psychiatric/Behavioral: Negative.  Negative for agitation.         Constitutional: well developed, well nourished    HEENT: Normocephalic and atraumatic, normal hearing    Respiratory: Non labored respirations noted    RLE Exam: Distal tip of right second digit healed. Hallux healed. No edema or erythema noted.  Negative Homans    Psychiatric: A&O x 3 with normal mood and affect. NAD.       Procedures        ASSESSMENT AND PLAN    Say was seen today for post-op follow-up.    Diagnoses and all orders for this visit:    Open displaced fracture of distal phalanx of lesser toe of right foot with routine healing, subsequent encounter    Open displaced fracture of distal phalanx of right great toe with routine healing, subsequent encounter      - pt doing very well  - pt is discharged from care at this time  - all questions were answered  - return to clinic as " needed          This document has been electronically signed by Ángel Griffin DPM on March 12, 2018 10:14 AM     3/12/2018  10:14 AM

## (undated) DEVICE — DISPOSABLE TOURNIQUET CUFF SINGLE BLADDER, DUAL PORT AND QUICK CONNECT CONNECTOR: Brand: COLOR CUFF

## (undated) DEVICE — GLV SURG SENSICARE MICRO PF LF 7.5 STRL

## (undated) DEVICE — GLV SURG TRIUMPH ORTHO W/ALOE PF LTX 6.5 STRL

## (undated) DEVICE — SPNG GZ WOVN 4X4IN 12PLY 10/BX STRL

## (undated) DEVICE — SUT PROLN 4/0 SH D/A 36IN 8521H

## (undated) DEVICE — CYSTO/BLADDER IRRIGATION SET, REGULATING CLAMP

## (undated) DEVICE — ANTIBACTERIAL UNDYED BRAIDED (POLYGLACTIN 910), SYNTHETIC ABSORBABLE SUTURE: Brand: COATED VICRYL

## (undated) DEVICE — PAD,ABDOMINAL,8"X10",ST,LF: Brand: MEDLINE

## (undated) DEVICE — GLV SURG SENSICARE GREEN W/ALOE PF LF 7 STRL

## (undated) DEVICE — GLV SURG SENSICARE GREEN W/ALOE PF LF 6 STRL

## (undated) DEVICE — DRAPE,U/ SHT,SPLIT,PLAS,STERIL: Brand: MEDLINE

## (undated) DEVICE — GLV SURG SENSICARE GREEN W/ALOE PF LF 8 STRL

## (undated) DEVICE — 9165 UNIVERSAL PATIENT PLATE: Brand: 3M™

## (undated) DEVICE — GOWN,PREVENTION PLUS,XLNG/XXLARGE,STRL: Brand: MEDLINE

## (undated) DEVICE — STERILE POLYISOPRENE POWDER-FREE SURGICAL GLOVES WITH EMOLLIENT COATING: Brand: PROTEXIS

## (undated) DEVICE — CONTAINER,SPECIMEN,OR STERILE,4OZ: Brand: MEDLINE

## (undated) DEVICE — DRSNG GZ CURAD XEROFORM NONADHR OVERWRAP 5X9IN

## (undated) DEVICE — SPNG LAP 18X18IN LF STRL PK/5